# Patient Record
Sex: MALE | NOT HISPANIC OR LATINO | Employment: FULL TIME | URBAN - METROPOLITAN AREA
[De-identification: names, ages, dates, MRNs, and addresses within clinical notes are randomized per-mention and may not be internally consistent; named-entity substitution may affect disease eponyms.]

---

## 2017-08-14 ENCOUNTER — OFFICE VISIT - HEALTHEAST (OUTPATIENT)
Dept: INTERNAL MEDICINE | Facility: CLINIC | Age: 47
End: 2017-08-14

## 2017-08-14 DIAGNOSIS — K21.9 GASTROESOPHAGEAL REFLUX DISEASE, ESOPHAGITIS PRESENCE NOT SPECIFIED: ICD-10-CM

## 2017-08-14 DIAGNOSIS — L72.3 SEBACEOUS CYST: ICD-10-CM

## 2017-08-14 ASSESSMENT — MIFFLIN-ST. JEOR: SCORE: 1614.22

## 2018-01-24 ENCOUNTER — DOCUMENTATION ONLY (OUTPATIENT)
Dept: GASTROENTEROLOGY | Facility: CLINIC | Age: 48
End: 2018-01-24

## 2018-01-25 NOTE — PROGRESS NOTES
REFERRAL REVIEW FORM    Referred by: unknown from available sent records    Reason for referral: GERD    Date referral received: 1/1919    Date records scanned for review: 1/24/18    Date records reviewed: 1/25/18 --> only one clinic note from >6 months ago available for review with no mention of GI referral    Automatic yes:     NA    Previous work up:    Unknown    Recommendation:    DO NOT SCHEDULE AT THIS TIME. Please clarify which provider is referring the patient or if this is a self-referral. Please obtain any more recent records and any prior GI evaluation (if there has been one).    When to schedule:      Date patient was contacted regarding scheduling:     Comments:

## 2018-03-20 ENCOUNTER — OFFICE VISIT - HEALTHEAST (OUTPATIENT)
Dept: INTERNAL MEDICINE | Facility: CLINIC | Age: 48
End: 2018-03-20

## 2018-03-20 DIAGNOSIS — K21.00 GASTROESOPHAGEAL REFLUX DISEASE WITH ESOPHAGITIS: ICD-10-CM

## 2018-03-20 ASSESSMENT — MIFFLIN-ST. JEOR: SCORE: 1577.93

## 2018-04-24 ENCOUNTER — COMMUNICATION - HEALTHEAST (OUTPATIENT)
Dept: INTERNAL MEDICINE | Facility: CLINIC | Age: 48
End: 2018-04-24

## 2018-04-24 ENCOUNTER — RECORDS - HEALTHEAST (OUTPATIENT)
Dept: ADMINISTRATIVE | Facility: OTHER | Age: 48
End: 2018-04-24

## 2018-09-11 ENCOUNTER — COMMUNICATION - HEALTHEAST (OUTPATIENT)
Dept: INTERNAL MEDICINE | Facility: CLINIC | Age: 48
End: 2018-09-11

## 2018-09-11 DIAGNOSIS — K21.9 GERD (GASTROESOPHAGEAL REFLUX DISEASE): ICD-10-CM

## 2021-05-31 VITALS — WEIGHT: 166 LBS | BODY MASS INDEX: 23.77 KG/M2 | HEIGHT: 70 IN

## 2021-06-01 VITALS — HEIGHT: 70 IN | BODY MASS INDEX: 22.62 KG/M2 | WEIGHT: 158 LBS

## 2021-06-12 NOTE — PROGRESS NOTES
"Office Visit - Follow up    [facility]   46 y.o. male    Date of Visit: 8/14/2017    Chief Complaint   Patient presents with     Mass     Between shoulder blades on back for couple months, no pain       Subjective: Very pleasant new patient initially concerned regarding a \"cyst on my back\".  Has had a painless area of palpable abnormality just to the right of his spine at about the C5 or C6 level.  No neck pain no discomfort in the cystic lesion itself no evidence of inflammation.    Also has frequent nocturnal reflux.  Discussed dietary changes has had no symptoms of dysphasia uses OTC antacid such as Tums usually on a daily basis        ROS: A comprehensive review of systems was performed and was otherwise negative except as mentioned above.     Exam  As a small marble sized noninflamed subcutaneous cystic lesion likely a sebaceous cyst this is not infected and would not recommend removal or any other definitive measure discussed symptoms to watch for.       /70  Pulse 70  Ht 5' 10\" (1.778 m)  Wt 166 lb (75.3 kg)  BMI 23.82 kg/m2    Assessment and Plan  Reflux esophagitis will begin omeprazole have strongly encouraged comprehensive physical and we will discuss response to omeprazole and need for chronic therapy    Discussed signs and symptoms of infection of sebaceous that she has to watch for    Skyler was seen today for mass.    Diagnoses and all orders for this visit:    Gastroesophageal reflux disease, esophagitis presence not specified    Sebaceous cyst    Other orders  -     omeprazole (PRILOSEC) 20 MG capsule; Take 1 capsule (20 mg total) by mouth daily.          Time: total time spent with the patient was 20 minutes of which >50% was spent in counseling and coordination of care        No Known Allergies    Medications :  Prior to Admission medications    Medication Sig Start Date End Date Taking? Authorizing Provider   omeprazole (PRILOSEC) 20 MG capsule Take 1 capsule (20 mg total) by " mouth daily. 8/14/17   Richard Jennings MD        Past Medical History: No past medical history on file.    Past Surgical History: No past surgical history on file.    Social History:   Social History     Social History     Marital status:      Spouse name: N/A     Number of children: N/A     Years of education: N/A     Occupational History     Not on file.     Social History Main Topics     Smoking status: Never Smoker     Smokeless tobacco: Never Used     Alcohol use Not on file     Drug use: Not on file     Sexual activity: Not on file     Other Topics Concern     Not on file     Social History Narrative     No narrative on file       Family History: No family history on file.      Richard Jennings MD

## 2021-06-16 NOTE — PROGRESS NOTES
"Office Visit - Follow up    [facility]yler [facility]   47 y.o. 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  Generally did respond to omeprazole however had stopped this for some time.  Now notes that he has nightly nocturnal reflux symptoms for which he uses calcium carbonate tablets.    Denies unexplained weight loss has had no hematemesis or melena has not had significant abdominal pain or abdominal distention.  Intermittent symptoms of reflux without dysphagia almost all symptoms are at night    Is concerned because of refractory symptoms.  Does not feel that omeprazole taken in the morning has been helpful    Remainder of review of systems negative     ROS: A comprehensive review of systems was performed and was otherwise negative except as mentioned above.     Exam  Head and neck unremarkable extremities unremarkable abdomen normal heart and lungs negative vitals as noted   /76  Pulse (!) 58  Ht 5' 10\" (1.778 m)  Wt 158 lb (71.7 kg)  BMI 22.67 kg/m2    Assessment and Plan  Nocturnal reflux primarily I would like to begin omeprazole 20 mg before the evening meal would also use nightly dissolved sucralfate prior to going to bed would also consider either Gaviscon or calcium carbonate antacids immediately before going to bed.  Discussed lifestyle changes and restrictions of ethanol caffeine spicy foods citrus foods tomato based products etc.    Follow-up here in 4-6 weeks may need to increase to higher dose PPI.  If he does have refractory symptoms we will plan EGD    Skyler was seen today for follow-up.    Diagnoses and all orders for this visit:    Gastroesophageal reflux disease with esophagitis    Other orders  -     sucralfate (CARAFATE) 1 gram tablet; Take 1 tablet (1 g " total) by mouth at bedtime.  -     omeprazole (PRILOSEC) 20 MG capsule; Take 1 capsule (20 mg total) by mouth daily.          Time: total time spent with the patient was 30 minutes of which >50% was spent in counseling and coordination of care        No Known Allergies    Medications :  Prior to Admission medications    Medication Sig Start Date End Date Taking? Authorizing Provider   calcium, as carbonate, (TUMS) 200 mg calcium (500 mg) chewable tablet Chew. 10/18/16  Yes PROVIDER, HISTORICAL   omeprazole (PRILOSEC) 20 MG capsule Take 1 capsule (20 mg total) by mouth daily. 3/20/18   Richard Jennings MD   sucralfate (CARAFATE) 1 gram tablet Take 1 tablet (1 g total) by mouth at bedtime. 3/20/18   Richard Jennings MD   omeprazole (PRILOSEC) 20 MG capsule Take 1 capsule (20 mg total) by mouth daily. 8/14/17 3/20/18  Richard Jennings MD        Past Medical History: No past medical history on file.    Past Surgical History: No past surgical history on file.    Social History:   Social History     Social History     Marital status:      Spouse name: N/A     Number of children: N/A     Years of education: N/A     Occupational History     Not on file.     Social History Main Topics     Smoking status: Never Smoker     Smokeless tobacco: Never Used     Alcohol use Not on file     Drug use: Not on file     Sexual activity: Not on file     Other Topics Concern     Not on file     Social History Narrative       Family History: No family history on file.      Richard Jennings MD

## 2021-08-21 ENCOUNTER — HEALTH MAINTENANCE LETTER (OUTPATIENT)
Age: 51
End: 2021-08-21

## 2021-10-16 ENCOUNTER — HEALTH MAINTENANCE LETTER (OUTPATIENT)
Age: 51
End: 2021-10-16

## 2022-10-01 ENCOUNTER — HEALTH MAINTENANCE LETTER (OUTPATIENT)
Age: 52
End: 2022-10-01

## 2023-10-15 ENCOUNTER — HEALTH MAINTENANCE LETTER (OUTPATIENT)
Age: 53
End: 2023-10-15

## 2025-04-29 NOTE — PROGRESS NOTES
GI notes or primary provider notes related to GI problem Y      Pathology reports N    Recent Lab  Reports Y    Radiology Reports (CT?MRI) N    Endoscopy N    Colonoscopy N    Referring GI Physician Name     Referring PCP Name [facility]    Notes:esophageal refulx    Referral Date 1/19/18    Date Complete Records Received 1/19/18    Date records scanned into epic  1/24/18    Provider Review Date     Date review routed back to Shireen     Letter sent       Notes               ----- Message from Anh Montejo MD sent at 4/28/2025 11:09 PM CDT -----  Please inform patient that brain MRI shows moderate atrophy or shrinking, and a couple of small, old strokes . Thank you.